# Patient Record
Sex: FEMALE | Race: WHITE | NOT HISPANIC OR LATINO
[De-identification: names, ages, dates, MRNs, and addresses within clinical notes are randomized per-mention and may not be internally consistent; named-entity substitution may affect disease eponyms.]

---

## 2019-10-21 ENCOUNTER — TRANSCRIPTION ENCOUNTER (OUTPATIENT)
Age: 49
End: 2019-10-21

## 2019-10-21 ENCOUNTER — EMERGENCY (EMERGENCY)
Facility: HOSPITAL | Age: 49
LOS: 1 days | Discharge: HOME | End: 2019-10-21
Attending: EMERGENCY MEDICINE
Payer: COMMERCIAL

## 2019-10-21 VITALS
HEART RATE: 90 BPM | HEIGHT: 65 IN | RESPIRATION RATE: 20 BRPM | DIASTOLIC BLOOD PRESSURE: 70 MMHG | TEMPERATURE: 98 F | SYSTOLIC BLOOD PRESSURE: 125 MMHG | WEIGHT: 139.99 LBS | OXYGEN SATURATION: 100 %

## 2019-10-21 VITALS
SYSTOLIC BLOOD PRESSURE: 138 MMHG | TEMPERATURE: 99 F | OXYGEN SATURATION: 100 % | DIASTOLIC BLOOD PRESSURE: 87 MMHG | HEART RATE: 90 BPM | RESPIRATION RATE: 20 BRPM

## 2019-10-21 LAB
ALBUMIN SERPL ELPH-MCNC: 4.7 G/DL — SIGNIFICANT CHANGE UP (ref 3.3–5.2)
ALP SERPL-CCNC: 70 U/L — SIGNIFICANT CHANGE UP (ref 40–120)
ALT FLD-CCNC: 13 U/L — SIGNIFICANT CHANGE UP
ANION GAP SERPL CALC-SCNC: 13 MMOL/L — SIGNIFICANT CHANGE UP (ref 5–17)
APPEARANCE UR: CLEAR — SIGNIFICANT CHANGE UP
APTT BLD: 30.3 SEC — SIGNIFICANT CHANGE UP (ref 27.5–36.3)
AST SERPL-CCNC: 15 U/L — SIGNIFICANT CHANGE UP
BASOPHILS # BLD AUTO: 0.02 K/UL — SIGNIFICANT CHANGE UP (ref 0–0.2)
BASOPHILS NFR BLD AUTO: 0.2 % — SIGNIFICANT CHANGE UP (ref 0–2)
BILIRUB SERPL-MCNC: 0.3 MG/DL — LOW (ref 0.4–2)
BILIRUB UR-MCNC: NEGATIVE — SIGNIFICANT CHANGE UP
BUN SERPL-MCNC: 12 MG/DL — SIGNIFICANT CHANGE UP (ref 8–20)
CALCIUM SERPL-MCNC: 9.5 MG/DL — SIGNIFICANT CHANGE UP (ref 8.6–10.2)
CHLORIDE SERPL-SCNC: 102 MMOL/L — SIGNIFICANT CHANGE UP (ref 98–107)
CO2 SERPL-SCNC: 26 MMOL/L — SIGNIFICANT CHANGE UP (ref 22–29)
COLOR SPEC: YELLOW — SIGNIFICANT CHANGE UP
CREAT SERPL-MCNC: 0.79 MG/DL — SIGNIFICANT CHANGE UP (ref 0.5–1.3)
DIFF PNL FLD: ABNORMAL
EOSINOPHIL # BLD AUTO: 0.02 K/UL — SIGNIFICANT CHANGE UP (ref 0–0.5)
EOSINOPHIL NFR BLD AUTO: 0.2 % — SIGNIFICANT CHANGE UP (ref 0–6)
GLUCOSE SERPL-MCNC: 104 MG/DL — SIGNIFICANT CHANGE UP (ref 70–115)
GLUCOSE UR QL: NEGATIVE MG/DL — SIGNIFICANT CHANGE UP
HCG UR QL: NEGATIVE — SIGNIFICANT CHANGE UP
HCT VFR BLD CALC: 39.3 % — SIGNIFICANT CHANGE UP (ref 34.5–45)
HGB BLD-MCNC: 12.9 G/DL — SIGNIFICANT CHANGE UP (ref 11.5–15.5)
IMM GRANULOCYTES NFR BLD AUTO: 0.2 % — SIGNIFICANT CHANGE UP (ref 0–1.5)
INR BLD: 1.08 RATIO — SIGNIFICANT CHANGE UP (ref 0.88–1.16)
KETONES UR-MCNC: NEGATIVE — SIGNIFICANT CHANGE UP
LEUKOCYTE ESTERASE UR-ACNC: NEGATIVE — SIGNIFICANT CHANGE UP
LYMPHOCYTES # BLD AUTO: 1.16 K/UL — SIGNIFICANT CHANGE UP (ref 1–3.3)
LYMPHOCYTES # BLD AUTO: 13.7 % — SIGNIFICANT CHANGE UP (ref 13–44)
MCHC RBC-ENTMCNC: 31.1 PG — SIGNIFICANT CHANGE UP (ref 27–34)
MCHC RBC-ENTMCNC: 32.8 GM/DL — SIGNIFICANT CHANGE UP (ref 32–36)
MCV RBC AUTO: 94.7 FL — SIGNIFICANT CHANGE UP (ref 80–100)
MONOCYTES # BLD AUTO: 0.42 K/UL — SIGNIFICANT CHANGE UP (ref 0–0.9)
MONOCYTES NFR BLD AUTO: 5 % — SIGNIFICANT CHANGE UP (ref 2–14)
NEUTROPHILS # BLD AUTO: 6.81 K/UL — SIGNIFICANT CHANGE UP (ref 1.8–7.4)
NEUTROPHILS NFR BLD AUTO: 80.7 % — HIGH (ref 43–77)
NITRITE UR-MCNC: NEGATIVE — SIGNIFICANT CHANGE UP
PH UR: 7 — SIGNIFICANT CHANGE UP (ref 5–8)
PLATELET # BLD AUTO: 245 K/UL — SIGNIFICANT CHANGE UP (ref 150–400)
POTASSIUM SERPL-MCNC: 3.7 MMOL/L — SIGNIFICANT CHANGE UP (ref 3.5–5.3)
POTASSIUM SERPL-SCNC: 3.7 MMOL/L — SIGNIFICANT CHANGE UP (ref 3.5–5.3)
PROT SERPL-MCNC: 7.7 G/DL — SIGNIFICANT CHANGE UP (ref 6.6–8.7)
PROT UR-MCNC: NEGATIVE MG/DL — SIGNIFICANT CHANGE UP
PROTHROM AB SERPL-ACNC: 12.5 SEC — SIGNIFICANT CHANGE UP (ref 10–12.9)
RBC # BLD: 4.15 M/UL — SIGNIFICANT CHANGE UP (ref 3.8–5.2)
RBC # FLD: 11.7 % — SIGNIFICANT CHANGE UP (ref 10.3–14.5)
SODIUM SERPL-SCNC: 141 MMOL/L — SIGNIFICANT CHANGE UP (ref 135–145)
SP GR SPEC: 1.01 — SIGNIFICANT CHANGE UP (ref 1.01–1.02)
UROBILINOGEN FLD QL: NEGATIVE MG/DL — SIGNIFICANT CHANGE UP
WBC # BLD: 8.45 K/UL — SIGNIFICANT CHANGE UP (ref 3.8–10.5)
WBC # FLD AUTO: 8.45 K/UL — SIGNIFICANT CHANGE UP (ref 3.8–10.5)

## 2019-10-21 PROCEDURE — 74177 CT ABD & PELVIS W/CONTRAST: CPT | Mod: 26

## 2019-10-21 PROCEDURE — G0378: CPT

## 2019-10-21 PROCEDURE — 71260 CT THORAX DX C+: CPT

## 2019-10-21 PROCEDURE — 85027 COMPLETE CBC AUTOMATED: CPT

## 2019-10-21 PROCEDURE — 96374 THER/PROPH/DIAG INJ IV PUSH: CPT | Mod: XU

## 2019-10-21 PROCEDURE — 81025 URINE PREGNANCY TEST: CPT

## 2019-10-21 PROCEDURE — 71260 CT THORAX DX C+: CPT | Mod: 26

## 2019-10-21 PROCEDURE — 70553 MRI BRAIN STEM W/O & W/DYE: CPT | Mod: 26

## 2019-10-21 PROCEDURE — 99220: CPT

## 2019-10-21 PROCEDURE — 80053 COMPREHEN METABOLIC PANEL: CPT

## 2019-10-21 PROCEDURE — 70450 CT HEAD/BRAIN W/O DYE: CPT | Mod: 26

## 2019-10-21 PROCEDURE — 86666 EHRLICHIA ANTIBODY: CPT

## 2019-10-21 PROCEDURE — 70553 MRI BRAIN STEM W/O & W/DYE: CPT

## 2019-10-21 PROCEDURE — 70450 CT HEAD/BRAIN W/O DYE: CPT

## 2019-10-21 PROCEDURE — 99284 EMERGENCY DEPT VISIT MOD MDM: CPT | Mod: 25

## 2019-10-21 PROCEDURE — 36415 COLL VENOUS BLD VENIPUNCTURE: CPT

## 2019-10-21 PROCEDURE — 86753 PROTOZOA ANTIBODY NOS: CPT

## 2019-10-21 PROCEDURE — 96376 TX/PRO/DX INJ SAME DRUG ADON: CPT | Mod: XU

## 2019-10-21 PROCEDURE — 85610 PROTHROMBIN TIME: CPT

## 2019-10-21 PROCEDURE — 96375 TX/PRO/DX INJ NEW DRUG ADDON: CPT | Mod: XU

## 2019-10-21 PROCEDURE — 74177 CT ABD & PELVIS W/CONTRAST: CPT

## 2019-10-21 PROCEDURE — 96361 HYDRATE IV INFUSION ADD-ON: CPT | Mod: XU

## 2019-10-21 PROCEDURE — 81001 URINALYSIS AUTO W/SCOPE: CPT

## 2019-10-21 PROCEDURE — 85730 THROMBOPLASTIN TIME PARTIAL: CPT

## 2019-10-21 RX ORDER — KETOROLAC TROMETHAMINE 30 MG/ML
15 SYRINGE (ML) INJECTION ONCE
Refills: 0 | Status: DISCONTINUED | OUTPATIENT
Start: 2019-10-21 | End: 2019-10-21

## 2019-10-21 RX ORDER — IBUPROFEN 200 MG
1 TABLET ORAL
Qty: 28 | Refills: 0
Start: 2019-10-21 | End: 2019-10-27

## 2019-10-21 RX ORDER — SODIUM CHLORIDE 9 MG/ML
1000 INJECTION INTRAMUSCULAR; INTRAVENOUS; SUBCUTANEOUS ONCE
Refills: 0 | Status: COMPLETED | OUTPATIENT
Start: 2019-10-21 | End: 2019-10-21

## 2019-10-21 RX ORDER — METOCLOPRAMIDE HCL 10 MG
10 TABLET ORAL ONCE
Refills: 0 | Status: COMPLETED | OUTPATIENT
Start: 2019-10-21 | End: 2019-10-21

## 2019-10-21 RX ORDER — KETOROLAC TROMETHAMINE 30 MG/ML
30 SYRINGE (ML) INJECTION ONCE
Refills: 0 | Status: DISCONTINUED | OUTPATIENT
Start: 2019-10-21 | End: 2019-10-21

## 2019-10-21 RX ADMIN — Medication 15 MILLIGRAM(S): at 10:58

## 2019-10-21 RX ADMIN — Medication 10 MILLIGRAM(S): at 10:59

## 2019-10-21 RX ADMIN — Medication 30 MILLIGRAM(S): at 19:38

## 2019-10-21 RX ADMIN — Medication 15 MILLIGRAM(S): at 13:23

## 2019-10-21 RX ADMIN — Medication 30 MILLIGRAM(S): at 20:30

## 2019-10-21 RX ADMIN — SODIUM CHLORIDE 1000 MILLILITER(S): 9 INJECTION INTRAMUSCULAR; INTRAVENOUS; SUBCUTANEOUS at 12:04

## 2019-10-21 RX ADMIN — SODIUM CHLORIDE 2000 MILLILITER(S): 9 INJECTION INTRAMUSCULAR; INTRAVENOUS; SUBCUTANEOUS at 11:04

## 2019-10-21 NOTE — CONSULT NOTE ADULT - ASSESSMENT
A/P : 50 y/o female c/o of waxing/waining H/A x 4 days, now controlled on Reglan and Torodol reports 2/10 , found to have new 1.4 cm lucent lesion in the Right Parietal Calvarium. CTH negative for hemorrhage.  Neuro exam is intact.   - MRI +/- contrast  - Final recs to follow after imaging complete  - continue management per primary team

## 2019-10-21 NOTE — ED CDU PROVIDER DISPOSITION NOTE - CLINICAL COURSE
50 yo female presented to ED with headache and back pain. CT Head demonstrated lesion. Patient placed in CDU for MRI of lesion as well as CTs to evaluate for spinal involvement. Patient found to have bony lesion, brain normal. Patient evaluated by neurosurgery. Patient prescribed Fioricet for relief. Patient to follow up outpatient.
no

## 2019-10-21 NOTE — ED ADULT NURSE NOTE - NSIMPLEMENTINTERV_GEN_ALL_ED
Implemented All Universal Safety Interventions:  [place] to call system. Call bell, personal items and telephone within reach. Instruct patient to call for assistance. Room bathroom lighting operational. Non-slip footwear when patient is off stretcher. Physically safe environment: no spills, clutter or unnecessary equipment. Stretcher in lowest position, wheels locked, appropriate side rails in place.

## 2019-10-21 NOTE — ED STATDOCS - MUSCULOSKELETAL, MLM
left para-lumbar tenderness, no localized midline tenderness, BL paracervical tenderness, no midline tenderness

## 2019-10-21 NOTE — ED ADULT NURSE NOTE - OBJECTIVE STATEMENT
49 year old female in acute distress from pain c/o severe headache x 4 days. Pt also c/o pain to neck that increases with movement. Pt denies fever, denies sick contacts, denies recent travel

## 2019-10-21 NOTE — ED STATDOCS - EYES, MLM
clear bilaterally.  Pupils 5mm, equal, round, and reactive to light. no ocular discharge no scleral icterus, funduscopic exam with no papilledema,

## 2019-10-21 NOTE — CONSULT NOTE ADULT - SUBJECTIVE AND OBJECTIVE BOX
HPI: Patient is a 50 y/o female with no significant PMH who presented to the ED today c/o of a 10/10 H/A which has been waxing and waning x 4 days. Pt reports that H/A was associated with body aches, chills, neck pain. Pt reports that she was taking 800mg of ibuprofen OTC with no relief of H/A, she went to an urgent care today who referred her to the ED. Pt reports that she was given meds in the ED and her H/A has improved and is now 2/10.  Reports associated N/V. She denies associated photophobia, visual changes, fever. She denies associated photophobia, visual changes, fever, hx of migraines, recent travel, tick bites, muscle weakness. CTH obtained is negative for ICH but shows a 1.4 cm lucent lesion in the Right Parietal Calvarium.       PAST MEDICAL & SURGICAL HISTORY:  No pertinent past medical history  No significant past surgical history    FAMILY HISTORY:  No pertinent family history in first degree relatives      SOCIAL HISTORY:  Tobacco Use:  EtOH use:   Substance:    Allergies    sulfa drugs (Hives)    Intolerances        REVIEW OF SYSTEMS  CONSTITUTIONAL: No fever, weight loss,   EYES: No eye pain, visual disturbances, or discharge  ENMT:  No difficulty hearing, tinnitus, vertigo; No sinus or throat pain  NECK: + stiffness  RESPIRATORY: No cough, wheezing, chills or hemoptysis; No shortness of breath  CARDIOVASCULAR: No chest pain, palpitations, dizziness, or leg swelling  GASTROINTESTINAL: No abdominal or epigastric pain. No nausea, vomiting, or hematemesis; No diarrhea or constipation. No melena or hematochezia.  GENITOURINARY: No dysuria, frequency, hematuria, or incontinence  NEUROLOGICAL: + H/A  SKIN: No itching, burning, rashes, or lesions   ALLERY AND IMMUNOLOGIC: No hives or eczema    HOME MEDICATIONS:  Home Medications:      MEDICATIONS:  Antibiotics:     Neuro:    Anticoagulation:    OTHER:    IVF:      Vital Signs Last 24 Hrs  T(C): 37 (21 Oct 2019 15:50), Max: 37 (21 Oct 2019 15:50)  T(F): 98.6 (21 Oct 2019 15:50), Max: 98.6 (21 Oct 2019 15:50)  HR: 71 (21 Oct 2019 15:50) (71 - 90)  BP: 102/67 (21 Oct 2019 15:50) (102/67 - 125/70)  RR: 20 (21 Oct 2019 15:50) (20 - 20)  SpO2: 100% (21 Oct 2019 15:50) (100% - 100%)      PHYSICAL EXAM:  GENERAL: NAD, well-groomed, well-developed  HEAD:  Atraumatic, normocephalic  EYES: Conjunctiva and sclera clear; corneal reflex intact  ENMT: No tonsillar erythema, exudates, or enlargement; moist mucous membranes, good dentition, no lesions  NECK: Supple, no JVD, dormal thyroid  ARIANA COMA SCORE: GCS 15  MENTAL STATUS: AAO x3  CRANIAL NERVES: Visual acuity normal for age, visual fields full to confrontation, PERRL. EOMI without nystagmus. Facial sensation intact V1-3 distribution b/l. Face symmetric w/ normal eye closure and smile, tongue midline. Hearing grossly intact. Speech clear. Head turning and shoulder shrug intact.   MOTOR: strength 5/5 b/l upper and lower extremities  [place]     Delt     Bicep     Tricep     HG  R                5/5       5/5         5/5         5/5  L                5/5       5/5         5/5         5/5  [place]      HF     KF      KE     PF     DF     EHL  R             5/5     5/5     5/5    5/5   5/5    5/5  L              5/5    5/5      5/5    5/5   5/5    5/5  SENSATION: grossly intact to light touch all extremities  COORDINATION: No upper extremity dysmetria  CHEST/LUNG: Clear to auscultation bilaterally; no rales, rhonchi, wheezing, or rubs  HEART: +S1/+S2; Regular rate and rhythm; no murmurs, rubs, or gallops  ABDOMEN: Soft, nontender, nondistended  EXTREMITIES:  2+ peripheral pulses, no clubbing, cyanosis, or edema  LYMPH: No lymphadenopathy noted  SKIN: Warm, dry; no rashes or lesions    LABS:                        12.9   8.45  )-----------( 245      ( 21 Oct 2019 11:16 )             39.3     10-    141  |  102  |  12.0  ----------------------------<  104  3.7   |  26.0  |  0.79    Ca    9.5      21 Oct 2019 11:16    TPro  7.7  /  Alb  4.7  /  TBili  0.3<L>  /  DBili  x   /  AST  15  /  ALT  13  /  AlkPhos  70  10-21    PT/INR - ( 21 Oct 2019 11:16 )   PT: 12.5 sec;   INR: 1.08 ratio         PTT - ( 21 Oct 2019 11:16 )  PTT:30.3 sec  Urinalysis Basic - ( 21 Oct 2019 15:09 )    Color: Yellow / Appearance: Clear / S.010 / pH: x  Gluc: x / Ketone: Negative  / Bili: Negative / Urobili: Negative mg/dL   Blood: x / Protein: Negative mg/dL / Nitrite: Negative   Leuk Esterase: Negative / RBC: 3-5 /HPF / WBC 0-2   Sq Epi: x / Non Sq Epi: Many / Bacteria: Few    RADIOLOGY & ADDITIONAL STUDIES:  < from: CT Head No Cont (10.21.19 @ 12:18) >  1.  No acute intracranial hemorrhage.   2.  Nonspecific circumscribed 1.6 cm lucent lesion in the right parietal   calvarium. Differential diagnosis includes benign and malignant   etiologies. If clinically warranted, MRI or bone scan can be obtained.

## 2019-10-21 NOTE — ED ADULT TRIAGE NOTE - CHIEF COMPLAINT QUOTE
Friday headache, aching, took Motrin. Saw urgent care today, neck and low back pain. No urinary s/s. nausea with vomit. No injury

## 2019-10-21 NOTE — ED STATDOCS - CARE PLAN
Principal Discharge DX:	Acute nonintractable headache, unspecified headache type Principal Discharge DX:	Acute nonintractable headache, unspecified headache type  Secondary Diagnosis:	Brain lesion

## 2019-10-21 NOTE — ED STATDOCS - OBJECTIVE STATEMENT
Pt is a 50 y/o F presenting to the ED with four days of headache and back pain. Pt states she initially felt some back discomfort while at work mid day the first day her sxs began. Later that same evening, developed the headache. HA is located in occipital, BL frontal, and behind her eyes and described as throbbing. Sxs have since been waxing and waning with episode becoming intense. She has found no relief with OTC meds. Developed chills and nausea yesterday. + photophobia. Denies fever and vomiting. Denies head trauma. No hx of migraine HA. Does not frequently get headaches. No recent travel, sick contacts, known tick or insect bites. Denies PMHx.  Americo Munguia- PMD

## 2019-10-21 NOTE — ED STATDOCS - PROGRESS NOTE DETAILS
Reports improvement but not complete resolution of symptoms.  CT results reviewed with patient.  case d/w neuro PA: will place in observation for MRI w/wo contrast.

## 2019-10-21 NOTE — ED CDU PROVIDER INITIAL DAY NOTE - PROGRESS NOTE DETAILS
case d/w neurosurg PA.  MRi with and without contrast ordered PA NOTE: CT Chest / Abd / Pelvis ordered to assess for mets.

## 2019-10-21 NOTE — ED CDU PROVIDER INITIAL DAY NOTE - OBJECTIVE STATEMENT
back pain and headache for 4 day.  started friday afternoon with slight back pain.  then developed headache friday evening.  reports headache since: waxing and waning in intensity, frontal/ retro-orbital and occipital, with assoc n/v, photosensitivity today.  no fever.  aslo with neck pain since onset of symtpoms.  denies trauma or injury.  denies prior headache problems.  denies URI, sinus congestion, cough, fever.  denies rash, tick bites, mosquito bites.  Feel like "head is going to explode": symptoms improved after medication administration

## 2019-10-21 NOTE — ED ADULT NURSE REASSESSMENT NOTE - NS ED NURSE REASSESS COMMENT FT1
assumed care of pt @ 1930,report received from Anselmo GOODSON, charting as noted. pt AOx4 c/o 7/10 headache radiating from forehead to back of the head. new order received for toradol x1. pt educated on medication, medication administered. awaiting CT.     HR is WNL, lung sounds are clear b/l, abd is soft and nontender with positive bowel sounds in all four quadrants, skin is warm, dry and appropriate for age and race. pt educated on plan of care and observation stay. Plan of care taught back to RN. Proficiency determined from successful pt teach back. Pt oriented to unit, staff, and room. Pt reeducated on call bell use. Bed locked in lowest position, call bell within reach. All questions and concerns addressed.

## 2019-10-23 PROBLEM — Z00.00 ENCOUNTER FOR PREVENTIVE HEALTH EXAMINATION: Status: ACTIVE | Noted: 2019-10-23

## 2019-10-23 PROBLEM — Z78.9 OTHER SPECIFIED HEALTH STATUS: Chronic | Status: ACTIVE | Noted: 2019-10-21

## 2019-10-23 LAB
A PHAGOCYTOPH IGG TITR SER IF: SIGNIFICANT CHANGE UP TITER
B BURGDOR AB SER QL IA: NEGATIVE — SIGNIFICANT CHANGE UP
B MICROTI IGG TITR SER: SIGNIFICANT CHANGE UP TITER
E CHAFFEENSIS IGG TITR SER IF: SIGNIFICANT CHANGE UP TITER

## 2019-10-29 ENCOUNTER — APPOINTMENT (OUTPATIENT)
Dept: NEUROSURGERY | Facility: CLINIC | Age: 49
End: 2019-10-29
Payer: COMMERCIAL

## 2019-10-29 DIAGNOSIS — D18.09 HEMANGIOMA OF OTHER SITES: ICD-10-CM

## 2019-10-29 DIAGNOSIS — G43.909 MIGRAINE, UNSPECIFIED, NOT INTRACTABLE, W/OUT STATUS MIGRAINOSUS: ICD-10-CM

## 2019-10-29 DIAGNOSIS — G93.9 DISORDER OF BRAIN, UNSPECIFIED: ICD-10-CM

## 2019-10-29 PROCEDURE — 99205 OFFICE O/P NEW HI 60 MIN: CPT

## 2019-10-29 RX ORDER — IBUPROFEN 200 MG/1
TABLET, COATED ORAL
Refills: 0 | Status: ACTIVE | COMMUNITY

## 2019-10-29 RX ORDER — BUTALBITAL, ACETAMINOPHEN AND CAFFEINE 325; 50; 40 MG/1; MG/1; MG/1
50-325-40 TABLET ORAL
Refills: 0 | Status: ACTIVE | COMMUNITY

## 2019-11-07 NOTE — HISTORY OF PRESENT ILLNESS
[de-identified] : 50 yo female presents to the office for a neurosurgical consultation for incidental bone lesion found on imaging.\par On Friday, Oct 18th,pt woke up with a headache, took Advil and went back to bed. The following day, she had an intense headache and pain in neck and back. Sje went to Urgent care in [place] and they advised her to go to ER.\par In Er, she had brain CT which showed the right bone lesion and MRI wo w demonstrated a nonspecific lesion on left side with enhancement.\par Today, she feels better but has headaches intermittently. The pain originates in the neck.\par \par Plan: MRI w wo in a year\par CTA head and neck w wo now\par Fu with neurology

## 2019-11-07 NOTE — ASSESSMENT
[FreeTextEntry1] : 2019\par \par \par \par Re:	Leser, Michelle \par :	1970\par \par The patient is a 49-year-old female who on 2019, developed a severe headache at night.  She treated it with Advil, and the next day she had neck pain and back pain.  She continued treating it with Advil over the next few days.  The headache became worse, and it seemed to be associated with back pain and neck pain and she was sent by Urgentcare to [facility].  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  This led to an MRI which again showed the bone lesion and also a nonspecific area of enhancement in the left inferior cerebellar hemisphere that may be vascular, and it may represent the jugular vein.  A CT scan of the chest, abdomen, and pelvis was negative.  She still gets occasional headaches, but she feels their origin is musculoskeletal in her neck.\par \par Her past medical history is negative.  She has had a sebaceous cyst removed from her back.  She is allergic to sulfa.  Her mother had an aneurysmal subarachnoid hemorrhage when she was young, and she works as a physical therapist for pediatrics.  She has a normal neurological examination.\par \par IMPRESSION: Her headache syndrome is not related to any of the radiographic findings.  There is no indication for neurosurgical intervention or biopsy.  At the present time, I have ordered a CTA of the neck to further elucidate the nonspecific left cerebellar enhancement and also a CTA of the head since there is a family history of subarachnoid hemorrhage.  She will have a follow-up MRI with and without gadolinium to monitor both of the currently found lesions.  This will be performed in one year.  I have additionally given her the name of Dr. Martin Bennett for a neurological consultation. \par \par In summary, there is no indication for neurosurgical intervention at present, and I will review her images in one year. \par \par \par \par Yaniv Kelly M.D., F.A.C.S.\par [facility]\par

## 2019-12-10 ENCOUNTER — APPOINTMENT (OUTPATIENT)
Dept: CT IMAGING | Facility: CLINIC | Age: 49
End: 2019-12-10
Payer: COMMERCIAL

## 2019-12-10 ENCOUNTER — OUTPATIENT (OUTPATIENT)
Dept: OUTPATIENT SERVICES | Facility: HOSPITAL | Age: 49
LOS: 1 days | End: 2019-12-10
Payer: COMMERCIAL

## 2019-12-10 DIAGNOSIS — D18.09 HEMANGIOMA OF OTHER SITES: ICD-10-CM

## 2019-12-10 PROCEDURE — 70496 CT ANGIOGRAPHY HEAD: CPT

## 2019-12-10 PROCEDURE — 70496 CT ANGIOGRAPHY HEAD: CPT | Mod: 26

## 2019-12-10 PROCEDURE — 70498 CT ANGIOGRAPHY NECK: CPT

## 2019-12-10 PROCEDURE — 70498 CT ANGIOGRAPHY NECK: CPT | Mod: 26

## 2024-01-26 ENCOUNTER — NON-APPOINTMENT (OUTPATIENT)
Age: 54
End: 2024-01-26